# Patient Record
Sex: FEMALE | Race: WHITE | NOT HISPANIC OR LATINO | Employment: OTHER | URBAN - METROPOLITAN AREA
[De-identification: names, ages, dates, MRNs, and addresses within clinical notes are randomized per-mention and may not be internally consistent; named-entity substitution may affect disease eponyms.]

---

## 2022-12-14 DIAGNOSIS — Z12.11 SCREENING FOR COLON CANCER: Primary | ICD-10-CM

## 2022-12-30 ENCOUNTER — OFFICE VISIT (OUTPATIENT)
Dept: GASTROENTEROLOGY | Facility: CLINIC | Age: 77
End: 2022-12-30
Payer: MEDICARE

## 2022-12-30 ENCOUNTER — TELEPHONE (OUTPATIENT)
Dept: GASTROENTEROLOGY | Facility: CLINIC | Age: 77
End: 2022-12-30

## 2022-12-30 VITALS
SYSTOLIC BLOOD PRESSURE: 137 MMHG | DIASTOLIC BLOOD PRESSURE: 80 MMHG | WEIGHT: 286 LBS | BODY MASS INDEX: 43.35 KG/M2 | HEIGHT: 68 IN | OXYGEN SATURATION: 98 % | HEART RATE: 65 BPM

## 2022-12-30 DIAGNOSIS — K76.0 FATTY LIVER: ICD-10-CM

## 2022-12-30 DIAGNOSIS — Z12.11 SCREENING FOR COLON CANCER: ICD-10-CM

## 2022-12-30 DIAGNOSIS — Z86.010 HISTORY OF COLON POLYPS: Primary | ICD-10-CM

## 2022-12-30 DIAGNOSIS — Z86.010 HISTORY OF COLON POLYPS: ICD-10-CM

## 2022-12-30 DIAGNOSIS — K59.00 CONSTIPATION, UNSPECIFIED CONSTIPATION TYPE: Primary | ICD-10-CM

## 2022-12-30 PROCEDURE — 99203 PR OFFICE/OUTPT VISIT, NEW, LEVL III, 30-44 MIN: ICD-10-PCS | Mod: S$GLB,,,

## 2022-12-30 PROCEDURE — 99203 OFFICE O/P NEW LOW 30 MIN: CPT | Mod: S$GLB,,,

## 2022-12-30 RX ORDER — ASPIRIN 81 MG/1
81 TABLET ORAL DAILY
COMMUNITY

## 2022-12-30 RX ORDER — MONTELUKAST SODIUM 10 MG/1
TABLET ORAL
COMMUNITY
Start: 2022-12-24

## 2022-12-30 RX ORDER — EMPAGLIFLOZIN AND LINAGLIPTIN 10; 5 MG/1; MG/1
TABLET, FILM COATED ORAL
COMMUNITY
Start: 2022-11-30

## 2022-12-30 RX ORDER — DULAGLUTIDE 1.5 MG/.5ML
INJECTION, SOLUTION SUBCUTANEOUS
COMMUNITY
Start: 2022-12-12

## 2022-12-30 RX ORDER — LABETALOL 200 MG/1
TABLET, FILM COATED ORAL
COMMUNITY
Start: 2022-10-28

## 2022-12-30 RX ORDER — LISINOPRIL 40 MG/1
TABLET ORAL
COMMUNITY
Start: 2022-11-23

## 2022-12-30 RX ORDER — CHOLECALCIFEROL (VITAMIN D3) 25 MCG
1000 TABLET ORAL DAILY
COMMUNITY

## 2022-12-30 RX ORDER — TOLTERODINE TARTRATE 4 MG/1
CAPSULE, EXTENDED RELEASE ORAL
COMMUNITY
Start: 2022-10-17

## 2022-12-30 RX ORDER — QUINAPRIL 40 MG/1
TABLET ORAL
COMMUNITY
Start: 2022-08-23 | End: 2022-12-30

## 2022-12-30 RX ORDER — FENOFIBRATE 145 MG/1
TABLET, FILM COATED ORAL
COMMUNITY
Start: 2022-12-24

## 2022-12-30 RX ORDER — AMLODIPINE BESYLATE 10 MG/1
TABLET ORAL
COMMUNITY
Start: 2022-11-03

## 2022-12-30 RX ORDER — POTASSIUM CHLORIDE 750 MG/1
TABLET, EXTENDED RELEASE ORAL
COMMUNITY
Start: 2022-12-24

## 2022-12-30 RX ORDER — SERTRALINE HYDROCHLORIDE 50 MG/1
TABLET, FILM COATED ORAL
COMMUNITY
Start: 2022-10-13

## 2022-12-30 RX ORDER — METFORMIN HYDROCHLORIDE 500 MG/1
2 TABLET ORAL 2 TIMES DAILY
COMMUNITY
Start: 2022-10-22

## 2022-12-30 RX ORDER — OLMESARTAN MEDOXOMIL 40 MG/1
TABLET ORAL
COMMUNITY
Start: 2022-10-07

## 2022-12-30 RX ORDER — SODIUM, POTASSIUM,MAG SULFATES 17.5-3.13G
1 SOLUTION, RECONSTITUTED, ORAL ORAL ONCE
Qty: 1 KIT | Refills: 0 | Status: SHIPPED | OUTPATIENT
Start: 2022-12-30 | End: 2022-12-30

## 2022-12-30 RX ORDER — ESTROGENS, CONJUGATED 0.62 MG/1
TABLET, FILM COATED ORAL
COMMUNITY
Start: 2022-10-13

## 2022-12-30 RX ORDER — FUROSEMIDE 40 MG/1
TABLET ORAL
COMMUNITY
Start: 2022-11-21

## 2022-12-30 NOTE — PATIENT INSTRUCTIONS
Schedule colonoscopy with Dr. Mcrae. Hold metformin the day before procedure.     Please notify my office if you have not been contacted within two weeks after any procedures, submitting any samples (biopsies, blood, stool, urine, etc.) or after any imaging (X-ray, CT, MRI, etc.).

## 2022-12-30 NOTE — PROGRESS NOTES
Clinic Note    Reason for visit:  The primary encounter diagnosis was Constipation, unspecified constipation type. Diagnoses of Fatty liver, History of colon polyps, Screening for colon cancer, and BMI 40.0-44.9, adult were also pertinent to this visit.    PCP: Emelina Verdugo   [address] / [place] [address]    HPI:  This is a 77 y.o. female who was last seen by Dr. Mcrae in 10/2019. She has h/o colon polyps and is due for colonoscopy. Also has h/o fatty liver. She reports constipation, may go 4 days without a BM and once she does, she may have diarrhea. That is normal for her. Constipation does not bother her. If she goes long without a BM,she can eat something that will help with BM. Does not take medicine for constipation. No blood in stool.       Labs 12/9/2022: ALT 23, AST 30, Cr 0.87  Labs 9/7/2022: Hgb 12.2, triglycerides 200H, AST 20, ALT 15, Alk Phos 65    Colonoscopy 10/14/2019: grade 2 internal hemorrhoids, polyps ok-repeat colon in 3 years    Review of Systems   Constitutional:  Positive for diaphoresis and fatigue. Negative for chills, fever and unexpected weight change.   HENT:  Positive for postnasal drip. Negative for mouth sores, nosebleeds, sore throat, trouble swallowing and voice change.    Eyes:  Positive for eye dryness. Negative for pain and discharge.   Respiratory:  Positive for cough and shortness of breath. Negative for apnea, choking, chest tightness and wheezing.    Cardiovascular:  Positive for leg swelling. Negative for chest pain, palpitations and claudication.   Gastrointestinal:  Positive for constipation and diarrhea. Negative for abdominal distention, abdominal pain, anal bleeding, blood in stool, change in bowel habit, nausea, rectal pain, vomiting, reflux, fecal incontinence and change in bowel habit.   Genitourinary:  Positive for bladder incontinence. Negative for difficulty urinating, dysuria, flank pain, frequency and hematuria.   Musculoskeletal:  Negative  for arthralgias, back pain, joint swelling and joint deformity.   Integumentary:  Negative for color change, rash and wound.   Allergic/Immunologic: Positive for environmental allergies. Negative for food allergies.   Neurological:  Negative for seizures, facial asymmetry, speech difficulty, weakness, headaches and memory loss.   Hematological:  Negative for adenopathy. Does not bruise/bleed easily.   Psychiatric/Behavioral:  Positive for sleep disturbance. Negative for agitation, behavioral problems, confusion and hallucinations.       Past Medical History:   Diagnosis Date    Anxiety disorder, unspecified     BMI 40.0-44.9, adult     Bradycardia     Colon polyp     HTN (hypertension)     Mixed hyperlipidemia     Skin cancer     s/p excision    Sleep apnea, unspecified     CPAP    Type 2 diabetes mellitus without complications     Unspecified urinary incontinence      Past Surgical History:   Procedure Laterality Date    BACK SURGERY N/A     TIMES 2    COLONSCOPY N/A     CYST REMOVAL N/A     INSERTION OF PACEMAKER  2009    REPLACEMENT OF PACEMAKER  2019    Dr. Sherwood    SKIN CANCER EXCISION N/A     FACE, ARMS    TOTAL ABDOMINAL HYSTERECTOMY N/A     TOTAL KNEE ARTHROPLASTY Bilateral     WRIST SURGERY Right      Family History   Problem Relation Age of Onset    Aneurysm Mother     Heart disease Father     Colon cancer Neg Hx     Stomach cancer Neg Hx     Pancreatic cancer Neg Hx     Esophageal cancer Neg Hx     Throat cancer Neg Hx     Liver cancer Neg Hx     Liver disease Neg Hx     Crohn's disease Neg Hx     Ulcerative colitis Neg Hx      Social History     Tobacco Use    Smoking status: Never    Smokeless tobacco: Never   Substance Use Topics    Alcohol use: Never    Drug use: Never     Review of patient's allergies indicates:   Allergen Reactions    Penicillins Hives      Medication List with Changes/Refills   New Medications    SODIUM,POTASSIUM,MAG SULFATES (SUPREP BOWEL PREP KIT) 17.5-3.13-1.6 GRAM SOLR    Take  "177 mLs by mouth once. Take according to kit instructions but DO NOT eat breakfast the morning of procedure. for 1 dose   Current Medications    AMLODIPINE (NORVASC) 10 MG TABLET        ASPIRIN (ECOTRIN) 81 MG EC TABLET    Take 81 mg by mouth once daily.    DETROL LA 4 MG 24 HR CAPSULE        FENOFIBRATE (TRICOR) 145 MG TABLET        FUROSEMIDE (LASIX) 40 MG TABLET        GLYXAMBI 10-5 MG TAB        LABETALOL (NORMODYNE) 200 MG TABLET        LISINOPRIL (PRINIVIL,ZESTRIL) 40 MG TABLET        METFORMIN (GLUCOPHAGE) 500 MG TABLET    2 tablets 2 (two) times a day.    MONTELUKAST (SINGULAIR) 10 MG TABLET        OLMESARTAN (BENICAR) 40 MG TABLET        POTASSIUM CHLORIDE SA (K-DUR,KLOR-CON M) 10 MEQ TABLET        PREMARIN 0.625 MG TABLET        SERTRALINE (ZOLOFT) 50 MG TABLET        TRULICITY 1.5 MG/0.5 ML PEN INJECTOR        VITAMIN D (VITAMIN D3) 1000 UNITS TAB    Take 1,000 Units by mouth once daily.   Discontinued Medications    QUINAPRIL (ACCUPRIL) 40 MG TABLET             Vital Signs:  /80   Pulse 65   Ht 5' 8" (1.727 m)   Wt 129.7 kg (286 lb)   SpO2 98%   BMI 43.49 kg/m²        Physical Exam  Vitals reviewed.   Constitutional:       General: She is awake. She is not in acute distress.     Appearance: Normal appearance. She is well-developed. She is obese. She is not ill-appearing, toxic-appearing or diaphoretic.   HENT:      Head: Normocephalic and atraumatic.      Nose: Nose normal.      Mouth/Throat:      Mouth: Mucous membranes are moist.      Pharynx: Oropharynx is clear. No oropharyngeal exudate or posterior oropharyngeal erythema.   Eyes:      General: Lids are normal. Gaze aligned appropriately. No scleral icterus.        Right eye: No discharge.         Left eye: No discharge.      Extraocular Movements: Extraocular movements intact.      Conjunctiva/sclera: Conjunctivae normal.   Neck:      Trachea: Trachea normal.   Cardiovascular:      Rate and Rhythm: Normal rate and regular rhythm.      " Pulses:           Radial pulses are 2+ on the right side and 2+ on the left side.   Pulmonary:      Effort: Pulmonary effort is normal. No respiratory distress.      Breath sounds: Normal breath sounds. No stridor. No wheezing or rhonchi.   Chest:      Chest wall: No tenderness.   Abdominal:      General: Bowel sounds are normal. There is no distension.      Palpations: Abdomen is soft. There is no fluid wave, hepatomegaly or mass.      Tenderness: There is no abdominal tenderness. There is no guarding or rebound.   Musculoskeletal:         General: No tenderness or deformity.      Cervical back: Full passive range of motion without pain and neck supple. No tenderness.      Right lower leg: No edema.      Left lower leg: No edema.   Lymphadenopathy:      Cervical: No cervical adenopathy.   Skin:     General: Skin is warm and dry.      Capillary Refill: Capillary refill takes less than 2 seconds.      Coloration: Skin is not cyanotic, jaundiced or pale.      Findings: No rash.   Neurological:      General: No focal deficit present.      Mental Status: She is alert and oriented to person, place, and time.      Cranial Nerves: No facial asymmetry.      Motor: No tremor.   Psychiatric:         Attention and Perception: Attention normal.         Mood and Affect: Mood and affect normal.         Speech: Speech normal.         Behavior: Behavior normal. Behavior is cooperative.          All of the data above and below has been reviewed by myself and any further interpretations will be reflected in the assessment and plan.   The data includes review of external notes, and independent interpretation of lab results, procedures, x-rays, and imaging reports.      Assessment:  Constipation, unspecified constipation type    Fatty liver    History of colon polyps  -     Ambulatory Referral to External Surgery    Screening for colon cancer  -     Ambulatory referral/consult to Gastroenterology  -     Ambulatory Referral to External  Surgery  -     sodium,potassium,mag sulfates (SUPREP BOWEL PREP KIT) 17.5-3.13-1.6 gram SolR; Take 177 mLs by mouth once. Take according to kit instructions but DO NOT eat breakfast the morning of procedure. for 1 dose  Dispense: 1 kit; Refill: 0    BMI 40.0-44.9, adult      Constipation controlled with diet.      Recommendations:  Schedule colonoscopy with Dr. Mcrae at [facility] with suprep. Hold metformin the day before procedure.     Risks, benefits, and alternatives of medical management, any associated procedures, and/or treatment discussed with the patient. Patient given opportunity to ask questions and voices understanding. Patient has elected to proceed with the recommended care modalities as discussed.    Follow up if symptoms worsen or fail to improve.    Order summary:  Orders Placed This Encounter   Procedures    Ambulatory Referral to External Surgery        Instructed patient to notify my office if they have not been contacted within two weeks after any procedures, submitting any samples (biopsies, blood, stool, urine, etc.) or after any imaging (X-ray, CT, MRI, etc.).      Giulia Maharaj NP    This document may have been created using a voice recognition transcribing system. Incorrect words or phrases may have been missed during proofreading. Please interpret accordingly or contact me for clarification.

## 2022-12-30 NOTE — TELEPHONE ENCOUNTER
[facility]  [address] [place] [address]  Phone: [phone]  Fax: [phone]    History & Physical         Provider: Dr. Ghassan Mcrae    Patient Name: Dasia ABEL (age):1945  77 y.o.           Gender: female   Phone: [phone]     Referring Physician: Emelina Verdugo     Vital Signs:   Height - 5'8  Weight - 286 lb   BMI -  43.49    Plan: Colonoscopy @ [facility]     Encounter Diagnoses   Name Primary?    History of colon polyps Yes    Screening for colon cancer            History:      Past Medical History:   Diagnosis Date    Anxiety disorder, unspecified     BMI 40.0-44.9, adult     Bradycardia     Colon polyp     HTN (hypertension)     Mixed hyperlipidemia     Skin cancer     s/p excision    Sleep apnea, unspecified     CPAP    Type 2 diabetes mellitus without complications     Unspecified urinary incontinence       Past Surgical History:   Procedure Laterality Date    BACK SURGERY N/A     TIMES 2    COLONSCOPY N/A     CYST REMOVAL N/A     INSERTION OF PACEMAKER      REPLACEMENT OF PACEMAKER      Dr. Sherwood    SKIN CANCER EXCISION N/A     FACE, ARMS    TOTAL ABDOMINAL HYSTERECTOMY N/A     TOTAL KNEE ARTHROPLASTY Bilateral     WRIST SURGERY Right       Medication List with Changes/Refills   Current Medications    AMLODIPINE (NORVASC) 10 MG TABLET        ASPIRIN (ECOTRIN) 81 MG EC TABLET    Take 81 mg by mouth once daily.    DETROL LA 4 MG 24 HR CAPSULE        FENOFIBRATE (TRICOR) 145 MG TABLET        FUROSEMIDE (LASIX) 40 MG TABLET        GLYXAMBI 10-5 MG TAB        LABETALOL (NORMODYNE) 200 MG TABLET        LISINOPRIL (PRINIVIL,ZESTRIL) 40 MG TABLET        METFORMIN (GLUCOPHAGE) 500 MG TABLET    2 tablets 2 (two) times a day.    MONTELUKAST (SINGULAIR) 10 MG TABLET        OLMESARTAN (BENICAR) 40 MG TABLET        POTASSIUM CHLORIDE SA (K-DUR,KLOR-CON M) 10 MEQ TABLET         PREMARIN 0.625 MG TABLET        SERTRALINE (ZOLOFT) 50 MG TABLET        SODIUM,POTASSIUM,MAG SULFATES (SUPREP BOWEL PREP KIT) 17.5-3.13-1.6 GRAM SOLR    Take 177 mLs by mouth once. Take according to kit instructions but DO NOT eat breakfast the morning of procedure. for 1 dose    TRULICITY 1.5 MG/0.5 ML PEN INJECTOR        VITAMIN D (VITAMIN D3) 1000 UNITS TAB    Take 1,000 Units by mouth once daily.      Review of patient's allergies indicates:   Allergen Reactions    Penicillins Hives      Family History   Problem Relation Age of Onset    Aneurysm Mother     Heart disease Father     Colon cancer Neg Hx     Stomach cancer Neg Hx     Pancreatic cancer Neg Hx     Esophageal cancer Neg Hx     Throat cancer Neg Hx     Liver cancer Neg Hx     Liver disease Neg Hx     Crohn's disease Neg Hx     Ulcerative colitis Neg Hx       Social History     Tobacco Use    Smoking status: Never    Smokeless tobacco: Never   Substance Use Topics    Alcohol use: Never    Drug use: Never        Physical Examination:     General Appearance:___________________________  HEENT: _____________________________________  Abdomen:____________________________________  Heart:________________________________________  Lungs:_______________________________________  Extremities:___________________________________  Skin:_________________________________________  Endocrine:____________________________________  Genitourinary:_________________________________  Neurological:__________________________________      Patient has been evaluated immediately prior to sedation and is medically cleared for endoscopy with IVCS as an ASA class: ______      Physician Signature: _________________________       Date: ________  Time: ________

## 2023-02-09 ENCOUNTER — OUTSIDE PLACE OF SERVICE (OUTPATIENT)
Dept: GASTROENTEROLOGY | Facility: CLINIC | Age: 78
End: 2023-02-09

## 2023-02-09 LAB — CRC RECOMMENDATION EXT: NORMAL

## 2023-02-09 PROCEDURE — 45380 PR COLONOSCOPY,BIOPSY: ICD-10-PCS | Mod: PT,,, | Performed by: INTERNAL MEDICINE

## 2023-02-09 PROCEDURE — 45380 COLONOSCOPY AND BIOPSY: CPT | Mod: PT,,, | Performed by: INTERNAL MEDICINE

## 2023-02-10 LAB — SPECIMEN TO PATHOLOGY: NORMAL

## 2023-02-16 ENCOUNTER — TELEPHONE (OUTPATIENT)
Dept: GASTROENTEROLOGY | Facility: CLINIC | Age: 78
End: 2023-02-16
Payer: MEDICARE

## 2023-02-16 NOTE — TELEPHONE ENCOUNTER
Patient advised of colonoscopy results and advised that  wants to repeat colonoscopy in 5 years. Voiced understanding.

## 2023-04-10 ENCOUNTER — DOCUMENTATION ONLY (OUTPATIENT)
Dept: GASTROENTEROLOGY | Facility: CLINIC | Age: 78
End: 2023-04-10
Payer: MEDICARE